# Patient Record
Sex: FEMALE | Race: WHITE | Employment: UNEMPLOYED | URBAN - METROPOLITAN AREA
[De-identification: names, ages, dates, MRNs, and addresses within clinical notes are randomized per-mention and may not be internally consistent; named-entity substitution may affect disease eponyms.]

---

## 2022-04-08 ENCOUNTER — OFFICE VISIT (OUTPATIENT)
Dept: OBGYN CLINIC | Age: 37
End: 2022-04-08
Payer: COMMERCIAL

## 2022-04-08 ENCOUNTER — OFFICE VISIT (OUTPATIENT)
Dept: OBGYN CLINIC | Age: 37
End: 2022-04-08

## 2022-04-08 VITALS
DIASTOLIC BLOOD PRESSURE: 80 MMHG | BODY MASS INDEX: 38.72 KG/M2 | HEIGHT: 62 IN | WEIGHT: 210.4 LBS | SYSTOLIC BLOOD PRESSURE: 120 MMHG

## 2022-04-08 DIAGNOSIS — Z01.419 ROUTINE GYNECOLOGICAL EXAMINATION: Primary | ICD-10-CM

## 2022-04-08 PROBLEM — E66.9 CLASS 2 OBESITY: Status: ACTIVE | Noted: 2022-04-08

## 2022-04-08 PROBLEM — G47.33 OBSTRUCTIVE SLEEP APNEA SYNDROME: Status: ACTIVE | Noted: 2022-04-08

## 2022-04-08 PROBLEM — F41.9 ANXIETY: Status: ACTIVE | Noted: 2022-04-08

## 2022-04-08 PROCEDURE — 99385 PREV VISIT NEW AGE 18-39: CPT | Performed by: OBSTETRICS & GYNECOLOGY

## 2022-04-08 PROCEDURE — 58301 REMOVE INTRAUTERINE DEVICE: CPT | Performed by: OBSTETRICS & GYNECOLOGY

## 2022-04-08 RX ORDER — TRAZODONE HYDROCHLORIDE 100 MG/1
TABLET ORAL
COMMUNITY
Start: 2022-04-04

## 2022-04-08 RX ORDER — VENLAFAXINE HYDROCHLORIDE 75 MG/1
CAPSULE, EXTENDED RELEASE ORAL
COMMUNITY
Start: 2022-04-02

## 2022-04-08 RX ORDER — FEXOFENADINE HCL AND PSEUDOEPHEDRINE HCI 180; 240 MG/1; MG/1
TABLET, EXTENDED RELEASE ORAL
COMMUNITY

## 2022-04-08 RX ORDER — CLONAZEPAM 0.5 MG/1
TABLET ORAL
COMMUNITY

## 2022-04-08 NOTE — PROGRESS NOTES
[facility]  OFFICE PROCEDURE PROGRESS NOTE        Chart reviewed for the following:   Rashaad Larkin, have reviewed the History, Physical and updated the Allergic reactions for Presley Rose Omar performed immediately prior to start of procedure:   Rashaad Larkin, have performed the following reviews on Micheal Reaves prior to the start of the procedure:            * Patient was identified by name and date of birth   * Agreement on procedure being performed was verified  * Risks and Benefits explained to the patient  * Procedure site verified and marked as necessary  * Patient was positioned for comfort  * Consent was signed and verified     Time: 240      Date of procedure: 2022    Procedure performed by:  Jun Alcantar MD    Provider assisted by: Jay Mackenzie MA    Patient assisted by: self    How tolerated by patient: tolerated the procedure well with no complications    Post Procedural Pain Scale: 2 - Hurts Little Bit    Comments: none   IUD REMOVAL  Indications for Removal:  Micheal Reaves is a ,  29 y.o. female  whose No LMP recorded. Who presents today for IUD removal. Her current IUD was placed 10/3/2017. She has not had  problems with the IUD. She requests removal of the IUD because the IUD effectiveness has . The IUD removal procedure was discussed with the patient and she had no further questions. Procedure: The patient was placed in a dorsal lithotomy position and appropriately draped. On bimanual exam the uterus was anterior and normal in size with no tenderness present. A speculum exam was performed and the cervix was visualized. The cervix was prepped with zephiran solution. The IUD string was visualized. Using ring forceps , the string was grasped and the IUD removed intact. The IUD was shown to the patient.    ANNUAL EXAM AGES 18-39    Micheal Reaves is a G4 ,  29 y.o. female   No LMP recorded. She presents for her annual checkup. She is having no significant problems. Menstrual status:  Her periods are light in flow due to IUD. She is using one to three pads or tampons per day, Had cycle beginning of March and previously cycle was 2 wks before that. .  She does not have dysmenorrhea. She reports no premenstrual symptoms. Contraception:  The current method of family planning is IUD. Sexual history:  She  reports being sexually active and has had partner(s) who are male. She reports using the following method of birth control/protection: I.U.D..    Medical conditions:  Since her last annual GYN exam about two years ago, she has had the following changes in her health history: . Pap and Mammogram History:  Her most recent Pap smear was normal, obtained 2 year(s) ago. The patient has never had a mammogram.    Gyn Flowsheet:  No flowsheet data found. Breast Cancer History:  none    Medical History: There is no problem list on file for this patient.     Past Medical History:   Diagnosis Date    Anxiety     Depression     Dysmenorrhea      Past Surgical History:   Procedure Laterality Date    HX ORTHOPAEDIC  2007    hand surgery -- [facility]     OB History    Para Term  AB Living   4 4 4 0 0 4   SAB IAB Ectopic Molar Multiple Live Births   0 0 0 0 0 4      # Outcome Date GA Lbr Fred/2nd Weight Sex Delivery Anes PTL Lv   4 Term 17 37w0d  8 lb 2 oz (3.685 kg)  Vag-Spont   [facility]      Name: Marito   3 Term 08 38w0d  6 lb 14 oz (3.118 kg) F Vag-Spont EPI  [facility]      Name: Janet Weaver   2 Term 06 39w0d  7 lb 1 oz (3.204 kg) F Vag-Spont None  [facility]      Birth Comments: Precipitous Delivery   1 Term 05 39w0d  6 lb 15 oz (3.147 kg) M Vag-Spont EPI N [facility]     Social History     Socioeconomic History    Marital status:    Occupational History    Occupation: Dispatcher   Tobacco Use    Smoking status: Former Smoker     Packs/day: 1.00     Start date:      Quit date:      Years since quittin.2    Smokeless tobacco: Never Used   Vaping Use    Vaping Use: Never used   Substance and Sexual Activity    Alcohol use: Yes    Drug use: Never    Sexual activity: Yes     Partners: Male     Birth control/protection: I.U.D. Family History   Problem Relation Age of Onset    Hypertension Maternal Grandmother     Diabetes Maternal Grandfather     Stroke Maternal Grandfather      Current Outpatient Medications on File Prior to Visit   Medication Sig Dispense Refill    levonorgestreL 20.1 mcg/24 hrs (6 yrs) 52 mg IUD by IntraUTERine route. No current facility-administered medications on file prior to visit. No Known Allergies    Review of Systems:  History obtained from the patient-negative for:  Constitutional: weight loss, fever, night sweats  HEENT: hearing loss, earache, congestion, snoring, sorethroat  CV: chest pain, palpitations, edema  Resp: cough, shortness of breath, wheezing  Breast: breast lumps, nipple discharge, galactorrhea  GI: change in bowel habits, abdominal pain, black or bloody stools  : frequency, dysuria, hematuria, vaginal discharge  MSK: back pain, joint pain, muscle pain  Skin: itching, rash, hives  Neuro: dizziness, headache, confusion, weakness  Psych: anxiety, depression, change in mood  Heme/lymph: bleeding, bruising, pallor    Physical Exam  There were no vitals taken for this visit.     Constitutional  · Appearance: well-nourished, well developed, alert, in no acute distress    HENT  · Head and Face: appears normal    Neck  · Inspection/Palpation: normal appearance, no masses or tenderness  · Lymph Nodes: no lymphadenopathy present  · Thyroid: gland size normal, nontender, no nodules or masses present on palpation    Chest  · Respiratory Effort: breathing unlabored  · Auscultation: normal breath sounds    Cardiovascular  · Heart:  · Auscultation: regular rate and rhythm without murmur    Breasts  · Inspection of Breasts: breasts symmetrical, no skin changes, no discharge present, nipple appearance normal, no skin retraction present  · Palpation of Breasts and Axillae: no masses present on palpation, no breast tenderness  · Axillary Lymph Nodes: no lymphadenopathy present    Gastrointestinal  · Abdominal Examination: abdomen non-tender to palpation, normal bowel sounds, no masses present  · Liver and spleen: no hepatomegaly present, spleen not palpable  · Hernias: no hernias identified    Genitourinary  · External Genitalia: normal appearance for age, no discharge present, no tenderness present, no inflammatory lesions present, no masses present, no atrophy present  · Vagina: normal vaginal vault without central or paravaginal defects, no discharge present, no inflammatory lesions present, no masses present  · Bladder: non-tender to palpation  · Urethra: appears normal  · Cervix: normal   · Uterus: normal size, shape and consistency  · Adnexa: no adnexal tenderness present, no adnexal masses present  · Perineum: perineum within normal limits, no evidence of trauma, no rashes or skin lesions present  · Anus: anus within normal limits, no hemorrhoids present  · Inguinal Lymph Nodes: no lymphadenopathy present    Skin  · General Inspection: no rash, no lesions identified    Neurologic/Psychiatric  · Mental Status:  · Orientation: grossly oriented to person, place and time  · Mood and Affect: mood normal, affect appropriate    . Assessment:  Routine gynecologic examination  Her current medical status is satisfactory with no evidence of significant gynecologic issues.     Plan:  Counseled re: diet, exercise, healthy lifestyle  Return for yearly wellness visits  Pt counseled regarding co-testing for high risk HPV with pap  Rec screening mammo at 41

## 2022-04-11 PROBLEM — G47.33 OBSTRUCTIVE SLEEP APNEA SYNDROME: Status: ACTIVE | Noted: 2022-04-08

## 2022-04-11 PROBLEM — F41.9 ANXIETY: Status: ACTIVE | Noted: 2022-04-08

## 2022-04-11 PROBLEM — E66.9 CLASS 2 OBESITY: Status: ACTIVE | Noted: 2022-04-08

## 2022-04-15 LAB
CYTOLOGIST CVX/VAG CYTO: NORMAL
CYTOLOGY CVX/VAG DOC CYTO: NORMAL
CYTOLOGY CVX/VAG DOC THIN PREP: NORMAL
DX ICD CODE: NORMAL
HPV I/H RISK 4 DNA CVX QL PROBE+SIG AMP: NEGATIVE
Lab: NORMAL
Lab: NORMAL
OTHER STN SPEC: NORMAL
STAT OF ADQ CVX/VAG CYTO-IMP: NORMAL

## 2023-07-13 ENCOUNTER — OFFICE VISIT (OUTPATIENT)
Age: 38
End: 2023-07-13

## 2023-07-13 VITALS
SYSTOLIC BLOOD PRESSURE: 121 MMHG | DIASTOLIC BLOOD PRESSURE: 84 MMHG | HEIGHT: 62 IN | BODY MASS INDEX: 37.39 KG/M2 | WEIGHT: 203.2 LBS

## 2023-07-13 DIAGNOSIS — Z01.419 WELL WOMAN EXAM WITH ROUTINE GYNECOLOGICAL EXAM: Primary | ICD-10-CM

## 2023-07-13 PROBLEM — E78.2 MIXED HYPERLIPIDEMIA: Status: ACTIVE | Noted: 2023-07-13

## 2023-07-13 PROBLEM — E11.65 TYPE 2 DIABETES MELLITUS WITH HYPERGLYCEMIA, WITHOUT LONG-TERM CURRENT USE OF INSULIN (HCC): Status: ACTIVE | Noted: 2023-07-13

## 2023-07-13 RX ORDER — GLIMEPIRIDE 2 MG/1
TABLET ORAL
COMMUNITY
Start: 2023-07-10

## 2023-07-13 RX ORDER — METFORMIN HYDROCHLORIDE 500 MG/1
TABLET, EXTENDED RELEASE ORAL
COMMUNITY
Start: 2023-05-15

## 2024-07-12 NOTE — PROGRESS NOTES
Denise Mederos is a 38 y.o. female returns for an annual exam     Chief Complaint   Patient presents with    Annual Exam       Patient's last menstrual period was 07/03/2024.  Her periods are moderate in flow and usually regular with a 26-32 day interval with 3-7 day duration.  She does not have dysmenorrhea.  Problems: no problems  Birth Control: vasectomy.  Last Pap: normal obtained 1 year(s) ago.  She does not have a history of NAVJOT 2, 3 or cervical cancer.   With regard to the Gardisil vaccine, she  is not sure.        1. Have you been to the ER, urgent care clinic, or hospitalized since your last visit? No    2. Have you seen or consulted any other health care providers outside of the [facility] since your last visit? PCP 4/2024 check up.      Examination chaperoned by QUINTEN RAHMAN CMA.

## 2024-07-15 ENCOUNTER — OFFICE VISIT (OUTPATIENT)
Age: 39
End: 2024-07-15
Payer: COMMERCIAL

## 2024-07-15 VITALS
DIASTOLIC BLOOD PRESSURE: 70 MMHG | SYSTOLIC BLOOD PRESSURE: 111 MMHG | WEIGHT: 184.4 LBS | BODY MASS INDEX: 33.93 KG/M2 | HEIGHT: 62 IN

## 2024-07-15 DIAGNOSIS — Z01.419 WELL WOMAN EXAM WITH ROUTINE GYNECOLOGICAL EXAM: Primary | ICD-10-CM

## 2024-07-15 PROCEDURE — 99395 PREV VISIT EST AGE 18-39: CPT | Performed by: OBSTETRICS & GYNECOLOGY

## 2024-07-15 RX ORDER — SEMAGLUTIDE 1.34 MG/ML
INJECTION, SOLUTION SUBCUTANEOUS
COMMUNITY
Start: 2024-04-02

## 2024-07-15 RX ORDER — ATORVASTATIN CALCIUM 10 MG/1
10 TABLET, FILM COATED ORAL DAILY
COMMUNITY
Start: 2024-06-11

## 2024-07-15 NOTE — PROGRESS NOTES
ANNUAL EXAM AGES 18-39    History:  Denise Mederos is a 38 y.o. year old  White (non-) female   Patient's last menstrual period was 2024.    She presents for her annual checkup.     Patient's last menstrual period was 2024.  Her periods are moderate in flow and usually regular with a 28 day interval with 3-4 day duration.  She does not have dysmenorrhea.  Problems: no problems  Birth Control: vasectomy.  Last Pap: normal obtained 1 year(s) ago.  She does not have a history of NAVJOT 2, 3 or cervical cancer.   With regard to the Gardisil vaccine, she  is not sure.      Medical History:  Problem List:  Patient Active Problem List    Diagnosis Date Noted    Type 2 diabetes mellitus with hyperglycemia, without long-term current use of insulin (HCC) 2023    Mixed hyperlipidemia 2023    Anxiety 2022    Class 2 obesity 2022    Obstructive sleep apnea syndrome 2022     Past Medical History:   Diagnosis Date    Anxiety     Depression     Dysmenorrhea     Type 2 diabetes mellitus without complication ([facility]) May 2023     Past Surgical History:   Procedure Laterality Date    HAND SURGERY  2007    hand surgery -- [facility]       OB History    Para Term  AB Living   4 4 4 0 0 4   SAB IAB Ectopic Molar Multiple Live Births   0 0 0 0 0 4      # Outcome Date GA Lbr Rakesh/2nd Weight Sex Delivery Anes PTL Lv   4 Term 17 37w0d  3.685 kg (8 lb 2 oz)  Vag-Spont   [facility]      Name: Richard   3 Term 08 38w0d  3.118 kg (6 lb 14 oz) F Vag-Spont EPI  [facility]      Name: Curtis   2 Term 06 39w0d  3.204 kg (7 lb 1 oz) F Vag-Spont None  [facility]      Birth Comments: Precipitous Delivery   1 Term 05 39w0d  3.147 kg (6 lb 15 oz) M Vag-Spont EPI N [facility]     Social History     Socioeconomic History    Marital status:      Spouse name: None    Number of children: 4    Years of education: None    Highest education level: None   Tobacco Use    Smoking status:

## 2024-07-18 LAB
CYTOLOGIST CVX/VAG CYTO: NORMAL
CYTOLOGY CVX/VAG DOC CYTO: NORMAL
CYTOLOGY CVX/VAG DOC THIN PREP: NORMAL
DX ICD CODE: NORMAL
HPV GENOTYPE REFLEX: NORMAL
HPV I/H RISK 4 DNA CVX QL PROBE+SIG AMP: NEGATIVE
Lab: NORMAL
OTHER STN SPEC: NORMAL
STAT OF ADQ CVX/VAG CYTO-IMP: NORMAL